# Patient Record
Sex: MALE | Race: WHITE | HISPANIC OR LATINO | Employment: OTHER | URBAN - METROPOLITAN AREA
[De-identification: names, ages, dates, MRNs, and addresses within clinical notes are randomized per-mention and may not be internally consistent; named-entity substitution may affect disease eponyms.]

---

## 2017-06-15 ENCOUNTER — LAB VISIT (OUTPATIENT)
Dept: LAB | Facility: HOSPITAL | Age: 82
End: 2017-06-15
Attending: INTERNAL MEDICINE
Payer: MEDICARE

## 2017-06-15 DIAGNOSIS — I10 ESSENTIAL HYPERTENSION: ICD-10-CM

## 2017-06-15 LAB
ALBUMIN SERPL BCP-MCNC: 3.6 G/DL
ALP SERPL-CCNC: 113 U/L
ALT SERPL W/O P-5'-P-CCNC: 40 U/L
ANION GAP SERPL CALC-SCNC: 6 MMOL/L
AST SERPL-CCNC: 31 U/L
BASOPHILS # BLD AUTO: 0.03 K/UL
BASOPHILS NFR BLD: 0.3 %
BILIRUB SERPL-MCNC: 0.7 MG/DL
BUN SERPL-MCNC: 19 MG/DL
CALCIUM SERPL-MCNC: 9.4 MG/DL
CHLORIDE SERPL-SCNC: 109 MMOL/L
CHOLEST/HDLC SERPL: 3.9 {RATIO}
CO2 SERPL-SCNC: 25 MMOL/L
CREAT SERPL-MCNC: 1.5 MG/DL
DIFFERENTIAL METHOD: ABNORMAL
EOSINOPHIL # BLD AUTO: 0.5 K/UL
EOSINOPHIL NFR BLD: 4.7 %
ERYTHROCYTE [DISTWIDTH] IN BLOOD BY AUTOMATED COUNT: 13.2 %
EST. GFR  (AFRICAN AMERICAN): 49.1 ML/MIN/1.73 M^2
EST. GFR  (NON AFRICAN AMERICAN): 42.4 ML/MIN/1.73 M^2
GLUCOSE SERPL-MCNC: 123 MG/DL
HCT VFR BLD AUTO: 47.9 %
HDL/CHOLESTEROL RATIO: 25.8 %
HDLC SERPL-MCNC: 217 MG/DL
HDLC SERPL-MCNC: 56 MG/DL
HGB BLD-MCNC: 15.9 G/DL
LDLC SERPL CALC-MCNC: 131.2 MG/DL
LYMPHOCYTES # BLD AUTO: 3 K/UL
LYMPHOCYTES NFR BLD: 29.7 %
MCH RBC QN AUTO: 31.9 PG
MCHC RBC AUTO-ENTMCNC: 33.2 %
MCV RBC AUTO: 96 FL
MONOCYTES # BLD AUTO: 1 K/UL
MONOCYTES NFR BLD: 10.2 %
NEUTROPHILS # BLD AUTO: 5.4 K/UL
NEUTROPHILS NFR BLD: 54.5 %
NONHDLC SERPL-MCNC: 161 MG/DL
PLATELET # BLD AUTO: 218 K/UL
PMV BLD AUTO: 11.9 FL
POTASSIUM SERPL-SCNC: 4.9 MMOL/L
PROT SERPL-MCNC: 7.3 G/DL
RBC # BLD AUTO: 4.99 M/UL
SODIUM SERPL-SCNC: 140 MMOL/L
TRIGL SERPL-MCNC: 149 MG/DL
WBC # BLD AUTO: 9.92 K/UL

## 2017-06-15 PROCEDURE — 80061 LIPID PANEL: CPT

## 2017-06-15 PROCEDURE — 80053 COMPREHEN METABOLIC PANEL: CPT

## 2017-06-15 PROCEDURE — 36415 COLL VENOUS BLD VENIPUNCTURE: CPT

## 2017-06-15 PROCEDURE — 85025 COMPLETE CBC W/AUTO DIFF WBC: CPT

## 2017-06-20 ENCOUNTER — OFFICE VISIT (OUTPATIENT)
Dept: INTERNAL MEDICINE | Facility: CLINIC | Age: 82
End: 2017-06-20
Payer: MEDICARE

## 2017-06-20 VITALS
HEIGHT: 68 IN | DIASTOLIC BLOOD PRESSURE: 78 MMHG | WEIGHT: 164 LBS | SYSTOLIC BLOOD PRESSURE: 128 MMHG | BODY MASS INDEX: 24.86 KG/M2 | HEART RATE: 76 BPM

## 2017-06-20 DIAGNOSIS — M80.052S PATHOLOGICAL FRACTURE OF LEFT HIP DUE TO AGE-RELATED OSTEOPOROSIS, SEQUELA: ICD-10-CM

## 2017-06-20 DIAGNOSIS — N18.30 CKD (CHRONIC KIDNEY DISEASE) STAGE 3, GFR 30-59 ML/MIN: ICD-10-CM

## 2017-06-20 DIAGNOSIS — R05.9 COUGH: ICD-10-CM

## 2017-06-20 DIAGNOSIS — I10 ESSENTIAL HYPERTENSION: Primary | ICD-10-CM

## 2017-06-20 PROCEDURE — 1159F MED LIST DOCD IN RCRD: CPT | Mod: S$GLB,,, | Performed by: INTERNAL MEDICINE

## 2017-06-20 PROCEDURE — 99499 UNLISTED E&M SERVICE: CPT | Mod: S$GLB,,, | Performed by: INTERNAL MEDICINE

## 2017-06-20 PROCEDURE — 1126F AMNT PAIN NOTED NONE PRSNT: CPT | Mod: S$GLB,,, | Performed by: INTERNAL MEDICINE

## 2017-06-20 PROCEDURE — 99999 PR PBB SHADOW E&M-EST. PATIENT-LVL III: CPT | Mod: PBBFAC,,, | Performed by: INTERNAL MEDICINE

## 2017-06-20 PROCEDURE — 99214 OFFICE O/P EST MOD 30 MIN: CPT | Mod: S$GLB,,, | Performed by: INTERNAL MEDICINE

## 2017-06-20 RX ORDER — FLUTICASONE PROPIONATE 50 MCG
2 SPRAY, SUSPENSION (ML) NASAL DAILY
Qty: 16 G | Refills: 12
Start: 2017-06-20 | End: 2017-07-20

## 2017-06-20 NOTE — PROGRESS NOTES
Subjective:       Patient ID: Jelani High is a 83 y.o. male.    Chief Complaint: Annual Exam and Cough (from time to time)    Cough   This is a recurrent problem. The problem has been waxing and waning. The cough is non-productive. Associated symptoms include nasal congestion and postnasal drip. Pertinent negatives include no chest pain, rash, sore throat, shortness of breath or wheezing. The symptoms are aggravated by lying down.   Hypertension   This is a chronic problem. The problem is controlled. Pertinent negatives include no chest pain, palpitations or shortness of breath. The current treatment provides significant improvement. There are no compliance problems.      Review of Systems   Constitutional: Negative for fatigue.   HENT: Positive for postnasal drip. Negative for sore throat.    Eyes: Negative for visual disturbance.   Respiratory: Positive for cough. Negative for shortness of breath and wheezing.    Cardiovascular: Negative for chest pain and palpitations.   Gastrointestinal: Negative for abdominal pain.   Genitourinary: Negative for dysuria, frequency and hematuria.   Musculoskeletal: Negative for joint swelling.   Skin: Negative for rash.   Neurological: Negative for speech difficulty and weakness.   Psychiatric/Behavioral: Negative for dysphoric mood.       Objective:      Physical Exam   Constitutional: He is oriented to person, place, and time. He appears well-developed and well-nourished. No distress.   HENT:   Head: Normocephalic and atraumatic.   Mouth/Throat: Oropharynx is clear and moist.   Eyes: Conjunctivae are normal. Pupils are equal, round, and reactive to light.   Neck: Normal range of motion. Neck supple.   Cardiovascular: Normal rate, regular rhythm and normal heart sounds.    Pulmonary/Chest: Effort normal and breath sounds normal. He has no wheezes.   Abdominal: Soft. Bowel sounds are normal. There is no tenderness.   Genitourinary: Prostate normal. Rectal exam shows guaiac  negative stool.   Musculoskeletal: Normal range of motion. He exhibits no edema or tenderness.   Neurological: He is alert and oriented to person, place, and time. No cranial nerve deficit.   Skin: No erythema.   Psychiatric: He has a normal mood and affect.   Vitals reviewed.      Assessment:       1. Essential hypertension    2. CKD (chronic kidney disease) stage 3, GFR 30-59 ml/min    3. Pathological fracture of left hip due to age-related osteoporosis, sequela    4. Cough        Plan:       Jelani was seen today for annual exam and cough.    Diagnoses and all orders for this visit:    Essential hypertension  -     CBC auto differential; Future  -     Comprehensive metabolic panel; Future  -     Lipid panel; Future    CKD (chronic kidney disease) stage 3, GFR 30-59 ml/min  -     CBC auto differential; Future  -     Comprehensive metabolic panel; Future  -     Lipid panel; Future    Pathological fracture of left hip due to age-related osteoporosis, sequela  Comments:  intol of Fosamax, not interested in other med    Cough  Comments:  likely allergic, but would consider changing ACE-I if Flonase no help  Orders:  -     fluticasone (FLONASE) 50 mcg/actuation nasal spray; 2 sprays by Each Nare route once daily.        Return in about 6 months (around 12/20/2017).

## 2017-07-07 RX ORDER — OMEPRAZOLE 40 MG/1
CAPSULE, DELAYED RELEASE ORAL
Qty: 30 CAPSULE | Refills: 12 | Status: SHIPPED | OUTPATIENT
Start: 2017-07-07

## 2017-11-06 RX ORDER — LISINOPRIL 10 MG/1
TABLET ORAL
Qty: 30 TABLET | Refills: 12 | Status: SHIPPED | OUTPATIENT
Start: 2017-11-06

## 2018-01-15 ENCOUNTER — OFFICE VISIT (OUTPATIENT)
Dept: INTERNAL MEDICINE | Facility: CLINIC | Age: 83
End: 2018-01-15
Payer: MEDICARE

## 2018-01-15 ENCOUNTER — TELEPHONE (OUTPATIENT)
Dept: INTERNAL MEDICINE | Facility: CLINIC | Age: 83
End: 2018-01-15

## 2018-01-15 VITALS — HEART RATE: 80 BPM | DIASTOLIC BLOOD PRESSURE: 60 MMHG | HEIGHT: 68 IN | SYSTOLIC BLOOD PRESSURE: 104 MMHG

## 2018-01-15 DIAGNOSIS — M54.41 ACUTE RIGHT-SIDED LOW BACK PAIN WITH RIGHT-SIDED SCIATICA: Primary | ICD-10-CM

## 2018-01-15 PROCEDURE — 99999 PR PBB SHADOW E&M-EST. PATIENT-LVL III: CPT | Mod: PBBFAC,,, | Performed by: INTERNAL MEDICINE

## 2018-01-15 PROCEDURE — 99213 OFFICE O/P EST LOW 20 MIN: CPT | Mod: S$GLB,,, | Performed by: INTERNAL MEDICINE

## 2018-01-15 PROCEDURE — 99499 UNLISTED E&M SERVICE: CPT | Mod: S$GLB,,, | Performed by: INTERNAL MEDICINE

## 2018-01-15 RX ORDER — PREDNISONE 20 MG/1
40 TABLET ORAL DAILY
Qty: 10 TABLET | Refills: 0 | Status: SHIPPED | OUTPATIENT
Start: 2018-01-15 | End: 2018-01-20

## 2018-01-15 RX ORDER — OSELTAMIVIR PHOSPHATE 75 MG/1
CAPSULE ORAL
COMMUNITY
Start: 2018-01-04 | End: 2018-01-30 | Stop reason: ALTCHOICE

## 2018-01-15 RX ORDER — HYDROCODONE BITARTRATE AND ACETAMINOPHEN 5; 325 MG/1; MG/1
TABLET ORAL
COMMUNITY
Start: 2018-01-04 | End: 2018-01-30

## 2018-01-15 RX ORDER — TIZANIDINE 2 MG/1
2-4 TABLET ORAL EVERY 8 HOURS PRN
Qty: 30 TABLET | Refills: 0 | Status: SHIPPED | OUTPATIENT
Start: 2018-01-15 | End: 2018-01-25

## 2018-01-15 RX ORDER — PREDNISONE 50 MG/1
TABLET ORAL
COMMUNITY
Start: 2018-01-04 | End: 2018-01-15 | Stop reason: ALTCHOICE

## 2018-01-15 NOTE — TELEPHONE ENCOUNTER
----- Message from Karon Lofton sent at 1/15/2018  7:58 AM CST -----  Contact: Ms High/   wife  Ms High states patient has flu bad coughing  unable to walk.   Patient need appointment today.       Please call Ms High [phone]    FYI:   Patient has 9:40am appointment to see Dr Pearl this morning

## 2018-01-15 NOTE — TELEPHONE ENCOUNTER
----- Message from Karon Lofton sent at 1/15/2018  7:58 AM CST -----  Contact: Ms High/   wife  Ms High states patient has flu bad coughing  unable to walk.   Patient need appointment today.       Please call Ms High [phone]    FYI:   Patient has 9:40am appointment to see Dr Pearl this morning

## 2018-01-15 NOTE — PROGRESS NOTES
"Subjective:       Patient ID: Jelani High is a 84 y.o. male.    Chief Complaint: Cough; Leg Pain; Hip Pain; and Back Pain    HPI    Patient of Prakash Easton MD, here today for Urgent Care visit. Upcoming appointment with Lab and PCP later this mo.    Pain in back with coughing. Hydrocodone helps for about 4 hours with pain but then persists. Pt unsure if still using prednisone.    Review of Systems    As per HPI    Objective:      Physical Exam   Constitutional: No distress.   Sitting in wheelchair, exam conducted from here.    Musculoskeletal:   Limited ability to stand from wheelchair, has to use hands to support weight. Mildly tender to deep palpation in R lower back. No tenderness to palpation in R lower leg.   Nursing note and vitals reviewed.      Vitals:    01/15/18 1017   BP: 104/60   BP Location: Left arm   Patient Position: Sitting   BP Method: Large (Manual)   Pulse: 80   Height: 5' 8" (1.727 m)     There is no height or weight on file to calculate BMI.    RESULTS: Reviewed labs from last 9 months    Assessment:       1. Acute right-sided low back pain with right-sided sciatica        Plan:   Jelani was seen today for cough, leg pain, hip pain and back pain.    Diagnoses and all orders for this visit:    Acute right-sided low back pain with right-sided sciatica:  possibly exacerbated by coughing. Avoid NSAIDs given CKD 3, use steroids and muscle relaxers, counseled about side effects of drowsiness with mm relaxers. Continue hydrocodone PRN from outside provider.  -     tiZANidine (ZANAFLEX) 2 MG tablet; Take 1-2 tablets (2-4 mg total) by mouth every 8 (eight) hours as needed.  -     predniSONE (DELTASONE) 20 MG tablet; Take 2 tablets (40 mg total) by mouth once daily.    Keep regular follow up appointments with Prakash Easton MD.   Usman Pearl MD  Internal Medicine    Portions of this note were completed using [employer] dictation software. Please excuse typographical or syntax errors.   "

## 2018-01-25 ENCOUNTER — LAB VISIT (OUTPATIENT)
Dept: LAB | Facility: HOSPITAL | Age: 83
End: 2018-01-25
Attending: INTERNAL MEDICINE
Payer: MEDICARE

## 2018-01-25 DIAGNOSIS — I10 ESSENTIAL HYPERTENSION: ICD-10-CM

## 2018-01-25 DIAGNOSIS — N18.30 CKD (CHRONIC KIDNEY DISEASE) STAGE 3, GFR 30-59 ML/MIN: ICD-10-CM

## 2018-01-25 LAB
ALBUMIN SERPL BCP-MCNC: 2.6 G/DL
ALP SERPL-CCNC: 162 U/L
ALT SERPL W/O P-5'-P-CCNC: 111 U/L
ANION GAP SERPL CALC-SCNC: 7 MMOL/L
AST SERPL-CCNC: 28 U/L
BASOPHILS # BLD AUTO: 0.03 K/UL
BASOPHILS NFR BLD: 0.3 %
BILIRUB SERPL-MCNC: 0.6 MG/DL
BUN SERPL-MCNC: 25 MG/DL
CALCIUM SERPL-MCNC: 9 MG/DL
CHLORIDE SERPL-SCNC: 105 MMOL/L
CHOLEST SERPL-MCNC: 152 MG/DL
CHOLEST/HDLC SERPL: 3 {RATIO}
CO2 SERPL-SCNC: 25 MMOL/L
CREAT SERPL-MCNC: 1.3 MG/DL
DIFFERENTIAL METHOD: ABNORMAL
EOSINOPHIL # BLD AUTO: 0.2 K/UL
EOSINOPHIL NFR BLD: 1.5 %
ERYTHROCYTE [DISTWIDTH] IN BLOOD BY AUTOMATED COUNT: 13.4 %
EST. GFR  (AFRICAN AMERICAN): 58 ML/MIN/1.73 M^2
EST. GFR  (NON AFRICAN AMERICAN): 50 ML/MIN/1.73 M^2
GLUCOSE SERPL-MCNC: 139 MG/DL
HCT VFR BLD AUTO: 44 %
HDLC SERPL-MCNC: 51 MG/DL
HDLC SERPL: 33.6 %
HGB BLD-MCNC: 14.4 G/DL
LDLC SERPL CALC-MCNC: 81 MG/DL
LYMPHOCYTES # BLD AUTO: 1.5 K/UL
LYMPHOCYTES NFR BLD: 12.1 %
MCH RBC QN AUTO: 31.5 PG
MCHC RBC AUTO-ENTMCNC: 32.7 G/DL
MCV RBC AUTO: 96 FL
MONOCYTES # BLD AUTO: 1 K/UL
MONOCYTES NFR BLD: 8.2 %
NEUTROPHILS # BLD AUTO: 8.9 K/UL
NEUTROPHILS NFR BLD: 74.3 %
NONHDLC SERPL-MCNC: 101 MG/DL
PLATELET # BLD AUTO: 357 K/UL
PMV BLD AUTO: 10.3 FL
POTASSIUM SERPL-SCNC: 4.7 MMOL/L
PROT SERPL-MCNC: 6.5 G/DL
RBC # BLD AUTO: 4.57 M/UL
SODIUM SERPL-SCNC: 137 MMOL/L
TRIGL SERPL-MCNC: 100 MG/DL
WBC # BLD AUTO: 11.98 K/UL

## 2018-01-25 PROCEDURE — 80053 COMPREHEN METABOLIC PANEL: CPT

## 2018-01-25 PROCEDURE — 85025 COMPLETE CBC W/AUTO DIFF WBC: CPT

## 2018-01-25 PROCEDURE — 36415 COLL VENOUS BLD VENIPUNCTURE: CPT

## 2018-01-25 PROCEDURE — 80061 LIPID PANEL: CPT

## 2018-01-30 ENCOUNTER — IMMUNIZATION (OUTPATIENT)
Dept: INTERNAL MEDICINE | Facility: CLINIC | Age: 83
End: 2018-01-30
Payer: MEDICARE

## 2018-01-30 ENCOUNTER — HOSPITAL ENCOUNTER (OUTPATIENT)
Dept: RADIOLOGY | Facility: HOSPITAL | Age: 83
Discharge: HOME | End: 2018-01-30
Attending: INTERNAL MEDICINE
Payer: MEDICARE

## 2018-01-30 ENCOUNTER — PES CALL (OUTPATIENT)
Dept: ADMINISTRATIVE | Facility: CLINIC | Age: 83
End: 2018-01-30

## 2018-01-30 ENCOUNTER — OFFICE VISIT (OUTPATIENT)
Dept: INTERNAL MEDICINE | Facility: CLINIC | Age: 83
End: 2018-01-30
Payer: MEDICARE

## 2018-01-30 VITALS — DIASTOLIC BLOOD PRESSURE: 80 MMHG | SYSTOLIC BLOOD PRESSURE: 130 MMHG | HEIGHT: 68 IN | HEART RATE: 100 BPM

## 2018-01-30 DIAGNOSIS — N18.30 CKD (CHRONIC KIDNEY DISEASE) STAGE 3, GFR 30-59 ML/MIN: ICD-10-CM

## 2018-01-30 DIAGNOSIS — M54.31 SCIATICA, RIGHT SIDE: ICD-10-CM

## 2018-01-30 DIAGNOSIS — D72.829 LEUKOCYTOSIS, UNSPECIFIED TYPE: ICD-10-CM

## 2018-01-30 DIAGNOSIS — I10 ESSENTIAL HYPERTENSION: Primary | ICD-10-CM

## 2018-01-30 DIAGNOSIS — R74.8 ELEVATED ALKALINE PHOSPHATASE LEVEL: ICD-10-CM

## 2018-01-30 PROCEDURE — 72100 X-RAY EXAM L-S SPINE 2/3 VWS: CPT | Mod: 26,,, | Performed by: RADIOLOGY

## 2018-01-30 PROCEDURE — 99499 UNLISTED E&M SERVICE: CPT | Mod: S$GLB,,, | Performed by: INTERNAL MEDICINE

## 2018-01-30 PROCEDURE — 99999 PR PBB SHADOW E&M-EST. PATIENT-LVL III: CPT | Mod: PBBFAC,,, | Performed by: INTERNAL MEDICINE

## 2018-01-30 PROCEDURE — 1159F MED LIST DOCD IN RCRD: CPT | Mod: S$GLB,,, | Performed by: INTERNAL MEDICINE

## 2018-01-30 PROCEDURE — 73502 X-RAY EXAM HIP UNI 2-3 VIEWS: CPT | Mod: 26,RT,, | Performed by: RADIOLOGY

## 2018-01-30 PROCEDURE — G0008 ADMIN INFLUENZA VIRUS VAC: HCPCS | Mod: S$GLB,,, | Performed by: INTERNAL MEDICINE

## 2018-01-30 PROCEDURE — 99214 OFFICE O/P EST MOD 30 MIN: CPT | Mod: S$GLB,,, | Performed by: INTERNAL MEDICINE

## 2018-01-30 PROCEDURE — 73502 X-RAY EXAM HIP UNI 2-3 VIEWS: CPT | Mod: TC,RT

## 2018-01-30 PROCEDURE — 72100 X-RAY EXAM L-S SPINE 2/3 VWS: CPT | Mod: TC

## 2018-01-30 PROCEDURE — 90662 IIV NO PRSV INCREASED AG IM: CPT | Mod: S$GLB,,, | Performed by: INTERNAL MEDICINE

## 2018-01-30 PROCEDURE — 1125F AMNT PAIN NOTED PAIN PRSNT: CPT | Mod: S$GLB,,, | Performed by: INTERNAL MEDICINE

## 2018-01-30 RX ORDER — TRAMADOL HYDROCHLORIDE 50 MG/1
50 TABLET ORAL EVERY 6 HOURS PRN
Qty: 50 TABLET | Refills: 1 | Status: SHIPPED | OUTPATIENT
Start: 2018-01-30 | End: 2018-03-01

## 2018-02-01 ENCOUNTER — TELEPHONE (OUTPATIENT)
Dept: INTERNAL MEDICINE | Facility: CLINIC | Age: 83
End: 2018-02-01

## 2018-02-01 DIAGNOSIS — R74.8 ELEVATED ALKALINE PHOSPHATASE LEVEL: ICD-10-CM

## 2018-02-01 DIAGNOSIS — R74.8 ELEVATED SERUM GGT LEVEL: ICD-10-CM

## 2018-02-01 DIAGNOSIS — M54.31 SCIATICA, RIGHT SIDE: Primary | ICD-10-CM

## 2018-02-01 NOTE — TELEPHONE ENCOUNTER
Wants test results and he is in terrific pain. Can you give him something for the pain?  Back and spine?

## 2018-02-01 NOTE — TELEPHONE ENCOUNTER
Sppoke with pt.  Tramadol adeq for now.  Will arrange for MRI of Lspine and Abd U/S (elev Alk phos and GGT)  Please call to arrange next week.  PC

## 2018-02-01 NOTE — TELEPHONE ENCOUNTER
----- Message from Philly Schmid sent at 2/1/2018 10:56 AM CST -----  Contact: Nasima/Spouse [phone]  Stated that the patient is in pain and needs to speak to you. Had XRAYs on 01/30/2018.    Please call and advise.    Thank You

## 2018-02-01 NOTE — TELEPHONE ENCOUNTER
X-rays ok.  Will need MRI lspine before visit to back and spine.      I can send Vicodin.   I'll call him at 4:30

## 2018-02-03 ENCOUNTER — HOSPITAL ENCOUNTER (OUTPATIENT)
Dept: RADIOLOGY | Facility: HOSPITAL | Age: 83
Discharge: HOME | End: 2018-02-03
Attending: INTERNAL MEDICINE
Payer: MEDICARE

## 2018-02-03 DIAGNOSIS — R74.8 ELEVATED SERUM GGT LEVEL: ICD-10-CM

## 2018-02-03 DIAGNOSIS — R74.8 ELEVATED ALKALINE PHOSPHATASE LEVEL: ICD-10-CM

## 2018-02-03 PROCEDURE — 76700 US EXAM ABDOM COMPLETE: CPT | Mod: 26,,, | Performed by: RADIOLOGY

## 2018-02-03 PROCEDURE — 76700 US EXAM ABDOM COMPLETE: CPT | Mod: TC

## 2018-02-04 ENCOUNTER — HOSPITAL ENCOUNTER (OUTPATIENT)
Dept: RADIOLOGY | Facility: HOSPITAL | Age: 83
Discharge: HOME | End: 2018-02-04
Attending: INTERNAL MEDICINE
Payer: MEDICARE

## 2018-02-04 DIAGNOSIS — M54.31 SCIATICA, RIGHT SIDE: ICD-10-CM

## 2018-02-04 PROCEDURE — 72148 MRI LUMBAR SPINE W/O DYE: CPT | Mod: TC

## 2018-02-04 PROCEDURE — 72148 MRI LUMBAR SPINE W/O DYE: CPT | Mod: 26,,, | Performed by: RADIOLOGY

## 2018-02-05 ENCOUNTER — TELEPHONE (OUTPATIENT)
Dept: INTERNAL MEDICINE | Facility: CLINIC | Age: 83
End: 2018-02-05

## 2018-02-05 DIAGNOSIS — M54.31 RIGHT SIDED SCIATICA: Primary | ICD-10-CM

## 2018-02-05 RX ORDER — PREDNISONE 20 MG/1
TABLET ORAL
COMMUNITY
Start: 2018-01-15

## 2018-02-05 RX ORDER — TIZANIDINE 2 MG/1
TABLET ORAL
COMMUNITY
Start: 2018-01-15

## 2018-02-05 NOTE — PROGRESS NOTES
Subjective:       Patient ID: Jelani High is a 84 y.o. male.    Chief Complaint: No chief complaint on file.    Hypertension   This is a chronic problem. The problem is unchanged. The problem is controlled. Pertinent negatives include no chest pain or shortness of breath. The current treatment provides significant improvement. There are no compliance problems.    Low-back Pain   This is a recurrent problem. The problem occurs constantly. The problem has been gradually worsening. Pertinent negatives include no chest pain. Associated symptoms comments: Right leg pain.     Review of Systems   Respiratory: Negative for shortness of breath.    Cardiovascular: Negative for chest pain.       Objective:      Physical Exam   Constitutional: He is oriented to person, place, and time. He appears well-developed and well-nourished. No distress.   HENT:   Head: Normocephalic and atraumatic.   Mouth/Throat: Oropharynx is clear and moist.   Eyes: Conjunctivae are normal. Pupils are equal, round, and reactive to light.   Neck: Normal range of motion. Neck supple.   Cardiovascular: Normal rate, regular rhythm and normal heart sounds.    Pulmonary/Chest: Effort normal and breath sounds normal. He has no wheezes.   Abdominal: Soft. Bowel sounds are normal. There is no tenderness.   Musculoskeletal: Normal range of motion. He exhibits no edema or tenderness.   Neurological: He is alert and oriented to person, place, and time. No cranial nerve deficit.   Skin: No erythema.   Psychiatric: He has a normal mood and affect.   Vitals reviewed.      Assessment:       1. Essential hypertension    2. Leukocytosis, unspecified type    3. Elevated alkaline phosphatase level    4. CKD (chronic kidney disease) stage 3, GFR 30-59 ml/min    5. Sciatica, right side        Plan:       Diagnoses and all orders for this visit:    Essential hypertension    Leukocytosis, unspecified type  -     CBC auto differential; Future    Elevated alkaline phosphatase  level  -     Protein electrophoresis, serum; Future  -     Gamma GT; Future    CKD (chronic kidney disease) stage 3, GFR 30-59 ml/min    Sciatica, right side  -     X-Ray Lumbar Spine Ap And Lateral; Future  -     X-Ray Hip 2 or 3 views Right; Future  -     traMADol (ULTRAM) 50 mg tablet; Take 1 tablet (50 mg total) by mouth every 6 (six) hours as needed for Pain.        Follow-up in about 4 months (around 5/30/2018) for F/U APPOINTMENT WITH ME.

## 2018-02-09 ENCOUNTER — TELEPHONE (OUTPATIENT)
Dept: INTERNAL MEDICINE | Facility: CLINIC | Age: 83
End: 2018-02-09

## 2018-02-09 NOTE — TELEPHONE ENCOUNTER
----- Message from Philly Schmid sent at 2/9/2018  1:35 PM CST -----  Contact: Spouse/Nasima [phone]  Test Results Request:     Test Performed: US    [facility] & [facility]    Please call and advise.    Thank You

## 2018-02-12 ENCOUNTER — HOSPITAL ENCOUNTER (OUTPATIENT)
Dept: INTERVENTIONAL RADIOLOGY/VASCULAR | Facility: HOSPITAL | Age: 83
Discharge: HOME | End: 2018-02-12
Attending: INTERNAL MEDICINE
Payer: MEDICARE

## 2018-02-12 VITALS
OXYGEN SATURATION: 96 % | SYSTOLIC BLOOD PRESSURE: 139 MMHG | RESPIRATION RATE: 18 BRPM | DIASTOLIC BLOOD PRESSURE: 80 MMHG | HEART RATE: 86 BPM

## 2018-02-12 DIAGNOSIS — M54.31 RIGHT SIDED SCIATICA: ICD-10-CM

## 2018-02-12 PROCEDURE — 25500020 PHARM REV CODE 255: Performed by: INTERNAL MEDICINE

## 2018-02-12 PROCEDURE — 62323 NJX INTERLAMINAR LMBR/SAC: CPT

## 2018-02-12 PROCEDURE — A4215 STERILE NEEDLE: HCPCS

## 2018-02-12 PROCEDURE — 62323 NJX INTERLAMINAR LMBR/SAC: CPT | Mod: RT,,, | Performed by: RADIOLOGY

## 2018-02-12 RX ADMIN — IOHEXOL 1 ML: 180 INJECTION INTRAVENOUS at 09:02

## 2018-02-12 NOTE — PROCEDURES
Radiology Post-Procedure Note    Pre Op Diagnosis: LBP    Post Op Diagnosis: Same    Procedure: Lumbar interlaminal WU at L3-4 and right TF WU at L4-5    Procedure performed by: Buzz Monroy MD    Written Informed Consent Obtained: Yes    Specimen Removed: NO    Estimated Blood Loss: Minimal    Findings: L3-4 interlaminar wu and right L4-5 TF WU    Level injected: L3-L4, L4-5  Needle used: 18 and  21gauge  Dose:  80 mg Depo-methylprednisolone   2 mL Lidocaine 1% MPF at L4-5 and 2 mL bupivicaine at L4-5    Patient tolerated procedure well.    Buzz Monroy MD  Department of Radiology  Pager: [phone]

## 2018-02-12 NOTE — PROGRESS NOTES
Patient stable, tolerated procedure well.  Discharge instruction, and follow up care reviewed.  Patient verbalized understanding, and denies further questions.  Provided with ROCU and after hours number.  Patient transported via wheelchair.

## 2018-02-12 NOTE — DISCHARGE INSTRUCTIONS
"For scheduling: Call Zee at [phone]    For questions or concerns call: SAEID MON-FRI 8 AM- 5PM: [phone].   **After hours and weekends: Call [phone] and ask for "Radiology Resident on call".    For immediate concerns that are not emergent, you may call our radiology clinic at: [phone]    Patient Instructions for Spine injections    No driving today. You may remove band-aid tomorrow. No tub baths, swimming or hot tub for 2 days. Showers permitted. You may experience some numbness/tingling at your legs/feet. This is often expected and should subside within several hours.    You may experience some discomfort at the site, but it should not be excruciating. It is ok to take over the counter pain reliever, if needed. It is also ok to use an ice pack if needed. Avoid heating pad use for 2 days.    It may take up to a week for you to notice any positive results from this procedure. Please inform your ordering physician if your symptoms return or get worse.    Call Doctor for:  Severe pain or headache  Loss of bowel/bladder control  Fever or chills  Redness or swelling around injection site.    Your injection today was 2/12/18      "

## 2018-02-12 NOTE — H&P
Radiology Minor Procedure Note    Procedure Requested: NORMAN    History of Present Illness:  Jelani High is a 84 y.o. male with history of low back pain.  Past Medical History:   Diagnosis Date    BCC (basal cell carcinoma)     Neck    Cataract     Bilateral    CKD (chronic kidney disease) stage 3, GFR 30-59 ml/min     history    Diverticulitis     Epistaxis     history    Erectile dysfunction     Fracture of femoral neck, left, closed s/p percutaneous screw fixation on 3/12/2015 3/11/2015    GERD (gastroesophageal reflux disease)     Hypertension     Laceration of left side of forehead without complication 3/12/2015    Macular degeneration     Osteoarthritis of left shoulder 3/14/2015     Past Surgical History:   Procedure Laterality Date    CATARACT EXTRACTION Right 3/31/2014    CATARACT EXTRACTION Left 6/30/2014    EYE SURGERY      FRACTURE SURGERY      HIP FRACTURE SURGERY Left 3/12/2015    Percutaneous screw fixation for femoral neck fracture    SHOULDER SURGERY Left 5/2015       Allergies:   Review of patient's allergies indicates:   Allergen Reactions    Fosamax [alendronate] Other (See Comments)    Percocet [oxycodone-acetaminophen] Rash       Current Inpatient Meds:   Home Meds:   Prior to Admission medications    Medication Sig Start Date End Date Taking? Authorizing Provider   calcium-vitamin D3 500 mg(1,250mg) -200 unit per tablet Take 1 tablet by mouth 2 (two) times daily.  Patient taking differently: Take 1 tablet by mouth once daily.  3/23/15 1/30/18  Parvin Proctor NP   FOLIC ACID/MV,FE,OTHER MIN (CENTRUM ORAL) Take 1 tablet by mouth once daily.    Historical Provider, MD   lisinopril 10 MG tablet TAKE ONE Tablet BY MOUTH EVERY DAY 11/6/17   Prakash Easton MD   naproxen sodium (ANAPROX) 220 MG tablet Take 220 mg by mouth once daily.    Historical Provider, MD   omeprazole (PRILOSEC) 40 MG capsule TAKE ONE CAPSULE BY MOUTH EVERY MORNING 7/7/17   Prakash Easton MD    predniSONE (DELTASONE) 20 MG tablet  1/15/18   Historical Provider, MD   tiZANidine (ZANAFLEX) 2 MG tablet  1/15/18   Historical Provider, MD   traMADol (ULTRAM) 50 mg tablet Take 1 tablet (50 mg total) by mouth every 6 (six) hours as needed for Pain. 1/30/18 3/1/18  Prakash Easton MD      Anticoagulants/Antiplatelets: no anticoagulation    Labs:  Lab Results   Component Value Date    INR 1.1 03/11/2015       Lab Results   Component Value Date    WBC 10.90 01/30/2018    HGB 14.6 01/30/2018    HCT 44.7 01/30/2018    MCV 96 01/30/2018     (H) 01/30/2018      Lab Results   Component Value Date     (H) 01/25/2018     01/25/2018    K 4.7 01/25/2018     01/25/2018    CO2 25 01/25/2018    BUN 25 (H) 01/25/2018    CREATININE 1.3 01/25/2018    CALCIUM 9.0 01/25/2018    MG 2.1 03/26/2015     (H) 01/25/2018    AST 28 01/25/2018    ALBUMIN 2.6 (L) 01/25/2018       Objective:  Vitals: Pulse: 86 (02/12/18 0806)  Resp: 18 (02/12/18 0806)  BP: 139/80 (02/12/18 0806)  SpO2: 96 % (02/12/18 0806)   PE:  Skin: no redness, tenderness, drainage    Plan: NORMAN at L3-4 and right TF NORMAN at L4-5. Informed consent obtained. Local sedation.    Buzz Monroy MD  Department of Radiology  Pager: [phone]

## 2018-02-14 NOTE — TELEPHONE ENCOUNTER
Spoke with pt-   Feeling better after NORMAN.  Liver studies c/w fatty liver-  Discussed diet.  Will call with any issues.

## 2019-06-06 ENCOUNTER — PES CALL (OUTPATIENT)
Dept: ADMINISTRATIVE | Facility: CLINIC | Age: 84
End: 2019-06-06